# Patient Record
Sex: FEMALE | Race: WHITE
[De-identification: names, ages, dates, MRNs, and addresses within clinical notes are randomized per-mention and may not be internally consistent; named-entity substitution may affect disease eponyms.]

---

## 2017-02-27 ENCOUNTER — HOSPITAL ENCOUNTER (EMERGENCY)
Dept: HOSPITAL 80 - CED | Age: 62
Discharge: HOME | End: 2017-02-27
Payer: COMMERCIAL

## 2017-02-27 VITALS
DIASTOLIC BLOOD PRESSURE: 62 MMHG | HEART RATE: 84 BPM | SYSTOLIC BLOOD PRESSURE: 145 MMHG | OXYGEN SATURATION: 97 % | TEMPERATURE: 98 F | RESPIRATION RATE: 18 BRPM

## 2017-02-27 DIAGNOSIS — J40: Primary | ICD-10-CM

## 2017-02-27 DIAGNOSIS — J04.0: ICD-10-CM

## 2017-02-27 NOTE — UCPHY
H & P


Time Seen by Provider: 02/27/17 11:27


Smoking Status: Never smoked


Constitutional: 





 Initial Vital Signs











Temperature (C)  36.6 C   02/27/17 10:55


 


Heart Rate  84   02/27/17 10:55


 


Respiratory Rate  18   02/27/17 10:55


 


Blood Pressure  145/62 H  02/27/17 10:55


 


O2 Sat (%)  97   02/27/17 10:55








 











O2 Delivery Mode               Room Air














Allergies/Adverse Reactions: 


 





aluminum acetate [From [place]] Allergy (Verified 11/10/16 08:28)


 


benzalkonium [From [place]] Allergy (Verified 11/10/16 08:28)


 


diltiazem Allergy (Verified 11/10/16 08:28)


 


iodine Allergy (Verified 11/10/16 08:28)


 


protein supplement [From [place]] Allergy (Verified 11/10/16 08:28)


 


sumatriptan [From Imitrex] Allergy (Verified 11/10/16 08:28)


 


sumatriptan succinate [From Imitrex] Allergy (Verified 11/10/16 08:28)


 


zileuton [From Zyflo] Allergy (Verified 11/10/16 08:28)


 


STEROIDS ENDING IN NELIA Allergy (Uncoded 09/16/10 18:06)


 








Home Medications: 














 Medication  Instructions  Recorded


 


Allegra Allergy  11/10/16


 


Amlodipine Besylate  11/10/16


 


Amoxicillin/Clavulanate Pot 875 mg PO BID #14 tab 11/10/16





[Augmentin 875 MG TAB (*)]  


 


Bystolic  11/10/16


 


Estrace  11/10/16


 


Fluticasone Nasal [Flonase Nasal 2 sprays NASAL DAILY #1 mdi 11/10/16





Spray (RX)]  


 


LORAZEPAM  11/10/16


 


Losartan Potassium  11/10/16


 


Montelukast Sodium  11/10/16


 


No Doze  11/10/16


 


Pantoprazole Sodium  11/10/16


 


AZITHROMYCIN [Z-PACK] 250 mg PO DAILY #6 tab 02/27/17


 


methylPREDNISolone [Medrol Dose 1 each PO AD #1 ea 02/27/17





Alan]  














Medical Decision Making





- Data Points


Medications Given: 





 








Discontinued Medications





Albuterol/Ipratropium (Duoneb)  3 ml IH EDNOW ONE


   Stop: 02/27/17 11:49


   Last Admin: 02/27/17 12:06 Dose:  3 ml








Departure





- Departure


Disposition: Home, Routine, Self-Care


Clinical Impression: 


 Bronchitis, Laryngitis





Condition: Good


Instructions:  Acute Bronchitis (ED), Laryngitis (ED)


Additional Instructions: 


Follow-up with your primary care physician in 3-5 days.


Referrals: 


[facility] [Other] - As per Instructions


Prescriptions: 


AZITHROMYCIN [Z-PACK] 250 mg PO DAILY #6 tab


methylPREDNISolone [Medrol Dose Alan] 1 each PO AD #1 ea





- PQRS


PQRS Measurement: 





na

## 2017-10-02 ENCOUNTER — HOSPITAL ENCOUNTER (OUTPATIENT)
Dept: HOSPITAL 80 - FED | Age: 62
Setting detail: OBSERVATION
LOS: 1 days | Discharge: HOME | End: 2017-10-03
Attending: INTERNAL MEDICINE | Admitting: INTERNAL MEDICINE
Payer: COMMERCIAL

## 2017-10-02 DIAGNOSIS — R07.9: Primary | ICD-10-CM

## 2017-10-02 DIAGNOSIS — K21.9: ICD-10-CM

## 2017-10-02 DIAGNOSIS — I10: ICD-10-CM

## 2017-10-02 DIAGNOSIS — R10.13: ICD-10-CM

## 2017-10-02 DIAGNOSIS — G90.50: ICD-10-CM

## 2017-10-02 LAB
% IMMATURE GRANULYOCYTES: 0.3 % (ref 0–1.1)
ABSOLUTE IMMATURE GRANULOCYTES: 0.02 10^3/UL (ref 0–0.1)
ABSOLUTE NRBC COUNT: 0 10^3/UL (ref 0–0.01)
ADD DIFF?: NO
ADD MORPH?: NO
ADD SCAN?: NO
ALBUMIN SERPL-MCNC: 4 G/DL (ref 3.5–5)
ALP SERPL-CCNC: 62 IU/L (ref 38–126)
ALT SERPL-CCNC: 28 IU/L (ref 9–52)
ANION GAP SERPL CALC-SCNC: 10 MEQ/L (ref 8–16)
AST SERPL-CCNC: 30 IU/L (ref 14–46)
ATYPICAL LYMPHOCYTE FLAG: 10 (ref 0–99)
BILIRUB SERPL-MCNC: 0.7 MG/DL (ref 0.1–1.4)
BILIRUBIN-CONJUGATED: 0.3 MG/DL (ref 0–0.5)
BILIRUBIN-UNCONJUGATED: 0.4 MG/DL (ref 0–1.1)
CALCIUM SERPL-MCNC: 10 MG/DL (ref 8.5–10.4)
CHLORIDE SERPL-SCNC: 103 MEQ/L (ref 97–110)
CO2 SERPL-SCNC: 25 MEQ/L (ref 22–31)
CREAT SERPL-MCNC: 0.9 MG/DL (ref 0.6–1)
ERYTHROCYTE [DISTWIDTH] IN BLOOD BY AUTOMATED COUNT: 12.9 % (ref 11.5–15.2)
FRAGMENT RBC FLAG: 0 (ref 0–99)
GFR SERPL CREATININE-BSD FRML MDRD: > 60 ML/MIN/{1.73_M2}
GLUCOSE SERPL-MCNC: 86 MG/DL (ref 70–100)
HCT VFR BLD CALC: 46.7 % (ref 38–47)
HGB BLD-MCNC: 15.9 G/DL (ref 12.6–16.3)
LEFT SHIFT FLG: 0 (ref 0–99)
LIPEMIA HEMOLYSIS FLAG: 90 (ref 0–99)
MCH RBC BLDCO QN: 30.6 PG (ref 27.9–34.1)
MCHC RBC AUTO-ENTMCNC: 34 G/DL (ref 32.4–36.7)
MCV RBC AUTO: 89.8 FL (ref 81.5–99.8)
NRBC-AUTO%: 0 % (ref 0–0.2)
PLATELET # BLD: 242 10^3/UL (ref 150–400)
PLATELET CLUMPS FLAG: 0 (ref 0–99)
PMV BLD AUTO: 10.4 FL (ref 8.7–11.7)
POTASSIUM SERPL-SCNC: 3.8 MEQ/L (ref 3.5–5.2)
PROT SERPL-MCNC: 6.9 G/DL (ref 6.3–8.2)
RBC # BLD AUTO: 5.2 10^6/UL (ref 4.18–5.33)
SODIUM SERPL-SCNC: 138 MEQ/L (ref 134–144)
SPECIMEN HEMOLYSIS: 100
TROPONIN I SERPL-MCNC: < 0.012 NG/ML (ref 0–0.03)

## 2017-10-02 PROCEDURE — 71020: CPT

## 2017-10-02 PROCEDURE — 93005 ELECTROCARDIOGRAM TRACING: CPT

## 2017-10-02 PROCEDURE — 93017 CV STRESS TEST TRACING ONLY: CPT

## 2017-10-02 PROCEDURE — G0378 HOSPITAL OBSERVATION PER HR: HCPCS

## 2017-10-02 RX ADMIN — GUAIFENESIN SCH MG: 600 TABLET, EXTENDED RELEASE ORAL at 20:42

## 2017-10-02 RX ADMIN — PANTOPRAZOLE SODIUM SCH MG: 40 TABLET, DELAYED RELEASE ORAL at 18:21

## 2017-10-02 NOTE — GHP
[f rep st]



                                                            HISTORY AND PHYSICAL





DATE OF ADMISSION:  10/02/2017



CHIEF COMPLAINT:  Chest pain.



HISTORY OF PRESENT ILLNESS:  A 62-year-old female with a history of recent chronic epigastric pain, b
eing managed by outpatient Gastroenterology and her primary care provider, who reports more consisten
t and intense epigastric pain over the course of the last week.  The patient then noted approximately
 72 hours ago that she was developing intermittent left-sided pressure-like chest pain that occasiona
lly would involve her left shoulder.  The patient noted the symptoms specifically when she was exerti
ng herself and said predictably the symptoms would resolve when she rested.  It was not associated wi
th shortness of breath, nausea, vomiting, or diaphoresis.  She does report that these chest symptoms 
are new and that prior to this she was having only epigastric pain, without any exertional component.




PAST MEDICAL HISTORY:  

1.  Adrenal adenoma.

2.  Hypertension.

3.  Thyroid nodules.

4.  Mast cell activation syndrome.

5.  Gastroesophageal reflux disease, on both PPI and H2 blocker.

6.  Regional pain syndrome, chronic.



SOCIAL HISTORY:  Negative for tobacco, alcohol or illicit drugs.



FAMILY HISTORY:  Negative for heart disease.



REVIEW OF SYSTEMS:  A 10-point review of systems is negative, with the exception of that reported in 
the HPI.



PHYSICAL EXAMINATION:  VITAL SIGNS:  Blood pressure 149/80, heart rate 66, respiratory rate 17, 94% o
n room air.  GENERAL:  This is a healthy-appearing middle-aged female in no acute distress.  HEENT:  
Notable for moist mucous membranes.  Eye exam is negative for any icterus.  CARDIAC:  Regular rate an
d rhythm.  PULMONARY:  Clear to auscultation bilaterally.  GASTROINTESTINAL:  Positive bowel sounds. 
 The abdomen is soft and nontender in all 4 quadrants.  MUSCULOSKELETAL:  Negative for any lower extr
emity edema.  SKIN:  Negative for any rashes.  NEUROLOGIC:  She is alert and oriented x3.  PSYCHIATRI
C:  She is pleasant and cooperative on interview and examination.



DATA:  Troponin is less than 0.012.  CBC and BMP are normal. 



EKG, which I personally reviewed and interpreted, shows no acute infiltrates or edema.  EKG shows no 
acute changes.



ASSESSMENT AND PLAN:  This is a 62-year-old female presenting with exertional chest pain.

1.  Acute exertional chest pain.  Suspicion for cardiac ischemia is low based on her recent intermitt
ent inconsistent symptoms, without a troponin excursion.  EKG is not concerning for an acute injury. 
 Will admit.  Rule out with serial troponins, EKGs.  Have ordered a treadmill test for the morning if
 she effectively rules out.

2.  Hypertension.  Will continue the patient's home medications once reconciled.

3.  Epigastric pain.  The patient has already had CT abdominal imaging and a thorough workup by [facility], including an EGD earlier this spring.  Will simply continue her home medicati
ons and follow clinically.

4.  Prophylaxis with Lovenox.

5.  Diet:  Cardiac.



DISPOSITION:  I expect less than 2 midnights if the patient rules out and has negative stress testing
 in the morning. 



I have discussed the case with the emergency room physician.  The patient will be triaged to the PCU 
for cardiac monitoring, rule out and care.





Job #:  [id number]

## 2017-10-02 NOTE — CPEKG
Heart Rate: 69

RR Interval: 870

P-R Interval: 176

QRSD Interval: 104

QT Interval: 396

QTC Interval: 425

P Axis: 69

QRS Axis: 10

T Wave Axis: 13

EKG Severity - NORMAL ECG -

EKG Impression: SINUS RHYTHM

Electronically Signed By: Raulito Gotti 02-Oct-2017 15:13:34

## 2017-10-02 NOTE — EDPHY
H & P


Time Seen by Provider: 10/02/17 10:28


HPI/ROS: 





Chief complaint.  Chest pain





HPI.  62-year-old female presents emergency department with chest pain for 3-4 

days.  Described as sharp radiates to neck, left shoulder blade and arm.  Began 

1 week ago with low abdominal cramping.  4 days ago was in the upper abdomen.  

She had a CT abdomen pelvis with oral contrast she has problems with IV 

contrast.  This was reported as normal.  Symptoms are better with rest and 

worse with exertion.  No real shortness of breath that has a known PFO and gets 

hypoxic with exercise.  She has had no fever or cough.  It feels like GERD but 

much more intense and she has been using to medications for her reflux without 

affect.  She had a cardiac catheterization in 2015 which showed PFO.  She does 

have a history of arrhythmias.  She does have ASCVD but does not know the 

results of her catheterization.





ROS


Constitutional.  no fever/chills, no weakness


Eyes.  no problems with vision


ENT.  no sore throat, no nasal drainage


Cardiovascular.  Chest pain


Respiratory.  no shortness of breath, no cough


Abdominal.  Upper abdominal pain


.  no problems urinating


MS.  no calf pain/swelling, no neck/back pain, no joint pain


Skin.  no rash


Lymph.  no swollen glands


Neuro.  no headache, no dizziness, no difficulty walking or with speech


Past Medical/Surgical History: 





Past medical history mast cell activation syndrome, hypertension, 

cholecystectomy, PFO, angina, complex migraines, GERD, PVCs, known right bundle 

branch block, regional pain syndrome right arm, hysterectomy


Social History: 





, nonsmoker, no alcohol


Smoking Status: Never smoked


Physical Exam: 





General Appearance:  Alert well-developed female mild distress vital signs 

stable


Eyes: Pupils equal and round no pallor or injection.


ENT, Mouth:  Mucous membranes are moist.


Respiratory:  There are no retractions, lungs are clear to auscultation.


Cardiovascular: Regular rate and rhythm.


Gastrointestinal:   Abdomen is soft and mild tenderness in the epigastrium, no 

masses, bowel sounds normal.


Neurological:  Awake and alert, sensory and motor exams grossly normal.


Skin: Warm and dry, no rashes.


Musculoskeletal:  Neck is supple nontender.


Extremities  symmetrical, full range of motion.


Psychiatric: Patient is oriented X 3, there is no agitation.


Constitutional: 


 Initial Vital Signs











Temperature (C)  37.0 C   10/02/17 10:18


 


Heart Rate  88   10/02/17 10:18


 


Respiratory Rate  16   10/02/17 10:18


 


Blood Pressure  144/104 H  10/02/17 10:18








 











O2 Delivery Mode               Room Air














Allergies/Adverse Reactions: 


 





aluminum acetate [From [place]] Allergy (Verified 11/10/16 08:28)


 


benzalkonium [From [place]] Allergy (Verified 11/10/16 08:28)


 


diltiazem Allergy (Verified 11/10/16 08:28)


 


iodine Allergy (Verified 11/10/16 08:28)


 


protein supplement [From [place]] Allergy (Verified 11/10/16 08:28)


 


sumatriptan [From Imitrex] Allergy (Verified 11/10/16 08:28)


 


sumatriptan succinate [From Imitrex] Allergy (Verified 11/10/16 08:28)


 


zileuton [From Zyflo] Allergy (Verified 11/10/16 08:28)


 


STEROIDS ENDING IN NELIA Allergy (Uncoded 09/16/10 18:06)


 








Home Medications: 














 Medication  Instructions  Recorded


 


Estradiol [Estrace Vaginal (*)] 1 karley VAG HS 11/10/16


 


Fluticasone Nasal [Flonase Nasal 2 sprays NASAL DAILY #1 mdi 11/10/16





Spray (RX)]  


 


Ipratropium/Albuterol [Duoneb (*)] 3 ml IH DAILY PRN 11/10/16


 


LORazepam [Ativan (*)] 1 mg PO  11/10/16


 


Losartan Potassium [Cozaar 50 mg 100 mg PO  11/10/16





(*)]  


 


Montelukast Sodium [Singulair 10 10 mg PO DAILY@1800 11/10/16





mg (*)]  


 


Nebivolol HCl [Bystolic 5 mg (*)] 5 mg PO HS 11/10/16


 


Pantoprazole Sodium [Protonix 40mg 40 mg PO DAILY 11/10/16





(*)]  


 


amLODIPine BESYLATE [Norvasc 5 mg 5 mg PO DAILY 11/10/16





(*)]  


 


diphenhydrAMINE [Benadryl 25 MG 25 mg PO DAILY PRN 11/10/16





(*)]  


 


Albuterol [Proventil Inhaler HFA 1 - 2 puffs IH DAILY PRN 10/02/17





(*)]  


 


Albuterol [Proventil Neb] 3 ml IH DAILY PRN 10/02/17


 


Aspirin [Aspirin 325 mg (*)] 325 mg PO DAILY 10/02/17


 


Cholecalciferol Vit D3 [Vitamin D3 5,000 units PO DAILY 10/02/17





(*)]  


 


EPINEPHrine [Epipen 0.3 MG] 0.3 mg IM ONCE PRN 10/02/17


 


Estradiol [Vivelle-Dot 0.075MG (*)] 0.075 mg TD RODRÍGUEZ 10/02/17


 


FLUTICASONE/SALMETEROL [ADVAIR HFA 1 puffs IH DAILY PRN 10/02/17





230-21 MCG INHALER]  


 


Famotidine [Pepcid 20 MG (*)] 20 mg PO BID 10/02/17


 


Fluticasone/Salmeterol [Advair Hfa 1 puffs IH DAILY PRN 10/02/17





115-21 Mcg Inhaler]  


 


Herbals/Supplements -Info Only 1 ea PO DAILY 10/02/17


 


Vitamin B Complex [B Complex] 1 each PO DAILY 10/02/17


 


guaiFENesin [Mucinex 600 MG (*)] 600 mg PO BID 10/02/17


 


predniSONE [predniSONE] 10 mg PO DAILY PRN 10/02/17














Medical Decision Making





- Diagnostics


EKG Interpretation: 





EKG interpreted by me shows normal sinus rhythm with normal interval and axis.  

QRS is normal there is no significant ST elevation or depression.  There is no 

arrhythmia.  The rate is 69


Imaging Results: 


 Imaging Impressions





Chest X-Ray  10/02/17 10:33


Impression: No evidence of acute cardiopulmonary abnormality.








Chest x-ray interpreted by me is negative


Procedures: 





IV normal saline, monitor


ED Course/Re-evaluation: 





Re-evaluation 12:30 p.m. patient is stable.  The patient and I discussed 

imaging and lab results.  We discussed treatment plan including recommendation 

for admission.  She expresses understanding





I consulted discussed the case with Dr. Alfonso, hospitalist, who agrees to the 

admission


Differential Diagnosis: 





I considered acute coronary syndrome, pulmonary embolus, GERD, pancreatitis.  

It is concerning that the patient has exertional symptoms that are relieved by 

rest increased concern for acute coronary syndrome





- Data Points


Laboratory Results: 


 Laboratory Results





 10/02/17 10:35 





 10/02/17 10:35 





 











  10/02/17 10/02/17





  10:35 10:35


 


WBC    6.45 10^3/uL 10^3/uL





    (3.80-9.50) 


 


RBC    5.20 10^6/uL 10^6/uL





    (4.18-5.33) 


 


Hgb    15.9 g/dL g/dL





    (12.6-16.3) 


 


Hct    46.7 % %





    (38.0-47.0) 


 


MCV    89.8 fL fL





    (81.5-99.8) 


 


MCH    30.6 pg pg





    (27.9-34.1) 


 


MCHC    34.0 g/dL g/dL





    (32.4-36.7) 


 


RDW    12.9 % %





    (11.5-15.2) 


 


Plt Count    242 10^3/uL 10^3/uL





    (150-400) 


 


MPV    10.4 fL fL





    (8.7-11.7) 


 


Neut % (Auto)    63.3 % %





    (39.3-74.2) 


 


Lymph % (Auto)    28.5 % %





    (15.0-45.0) 


 


Mono % (Auto)    5.0 % %





    (4.5-13.0) 


 


Eos % (Auto)    1.7 % %





    (0.6-7.6) 


 


Baso % (Auto)    1.2 % %





    (0.3-1.7) 


 


Nucleat RBC Rel Count    0.0 % %





    (0.0-0.2) 


 


Absolute Neuts (auto)    4.08 10^3/uL 10^3/uL





    (1.70-6.50) 


 


Absolute Lymphs (auto)    1.84 10^3/uL 10^3/uL





    (1.00-3.00) 


 


Absolute Monos (auto)    0.32 10^3/uL 10^3/uL





    (0.30-0.80) 


 


Absolute Eos (auto)    0.11 10^3/uL 10^3/uL





    (0.03-0.40) 


 


Absolute Basos (auto)    0.08 10^3/uL 10^3/uL





    (0.02-0.10) 


 


Absolute Nucleated RBC    0.00 10^3/uL 10^3/uL





    (0-0.01) 


 


Immature Gran %    0.3 % %





    (0.0-1.1) 


 


Immature Gran #    0.02 10^3/uL 10^3/uL





    (0.00-0.10) 


 


Sodium  138 mEq/L mEq/L  





   (134-144)  


 


Potassium  3.8 mEq/L mEq/L  





   (3.5-5.2)  


 


Chloride  103 mEq/L mEq/L  





   ()  


 


Carbon Dioxide  25 mEq/l mEq/l  





   (22-31)  


 


Anion Gap  10 mEq/L mEq/L  





   (8-16)  


 


BUN  20 mg/dL mg/dL  





   (7-23)  


 


Creatinine  0.9 mg/dL mg/dL  





   (0.6-1.0)  


 


Estimated GFR  > 60   





   


 


Glucose  86 mg/dL mg/dL  





   ()  


 


Calcium  10.0 mg/dL mg/dL  





   (8.5-10.4)  


 


Total Bilirubin  0.7 mg/dL mg/dL  





   (0.1-1.4)  


 


Conjugated Bilirubin  0.3 mg/dL mg/dL  





   (0.0-0.5)  


 


Unconjugated Bilirubin  0.4 mg/dL mg/dL  





   (0.0-1.1)  


 


AST  30 IU/L IU/L  





   (14-46)  


 


ALT  28 IU/L IU/L  





   (9-52)  


 


Alkaline Phosphatase  62 IU/L IU/L  





   ()  


 


Troponin I  < 0.012 ng/mL ng/mL  





   (0.000-0.034)  


 


Total Protein  6.9 g/dL g/dL  





   (6.3-8.2)  


 


Albumin  4.0 g/dL g/dL  





   (3.5-5.0)  


 


Lipase  88 IU/L IU/L  





   ()  


 


Specimen Hemolysis  100   





   











Medications Given: 


 








Discontinued Medications





Pantoprazole Sodium (Protonix)  40 mg PO DAILY TIA


   Stop: 03/31/18 13:14


   Last Admin: 10/02/17 14:14 Dose:  Not Given








Departure





- Departure


Disposition: [facility]


Condition: Good

## 2017-10-03 VITALS
OXYGEN SATURATION: 94 % | SYSTOLIC BLOOD PRESSURE: 129 MMHG | RESPIRATION RATE: 18 BRPM | HEART RATE: 68 BPM | DIASTOLIC BLOOD PRESSURE: 85 MMHG

## 2017-10-03 VITALS — TEMPERATURE: 97.9 F

## 2017-10-03 RX ADMIN — PANTOPRAZOLE SODIUM SCH MG: 40 TABLET, DELAYED RELEASE ORAL at 10:28

## 2017-10-03 RX ADMIN — GUAIFENESIN SCH MG: 600 TABLET, EXTENDED RELEASE ORAL at 08:24

## 2017-10-03 NOTE — ASDISCHSUM
----------------------------------------------

Discharge Information

----------------------------------------------

Plan Status:Home with No Needs                       Medically Cleared to Leave:10/03/2017

Discharge Date:10/03/2017 12:12 PM                   CM D/C Disposition:Home, Routine, Self-Care

ADT D/C Disposition:Home, Routine, Self-Care         Projected Discharge Date:10/03/2017 12:12 PM

Transportation at D/C:Family                         Discharge Delay Reason:

Follow-Up Date:10/03/2017 12:12 PM                   Discharge Slot:

Final Diagnosis:

----------------------------------------------

Placement Information

----------------------------------------------

----------------------------------------------

Patient Contact Information

----------------------------------------------

Contact Name:ANAIS                            Relationship:

Address:[address]                                     Home [phone]

                                                     Work [phone]

City:[place]                                       Alternate Phone:

[facility]/Zip Code:[place] [address]                              Email:

----------------------------------------------

Financial Information

----------------------------------------------

Financial Class:[employer]

Primary Plan Desc:[facility]       Primary Plan Number:[id number]

Secondary Plan Desc:                                 Secondary Plan Number:

 

 

----------------------------------------------

Assessment Information

----------------------------------------------

----------------------------------------------

[facility] CM Progress Note

----------------------------------------------

CM Note

 

CM Note                       

Notes:

Met with pt.   



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



Pt reports  coming to transport home.



No case management d/c needs identified d/t pt age and activity levels prior to admission.



Case management d/c poc:  Home Independent when medically stable.

 

Date Signed:  10/03/2017 11:00 AM

Electronically Signed By:Kenya Danielson RN

 

 

----------------------------------------------

Intervention Information

----------------------------------------------

## 2017-10-03 NOTE — GDS
[f rep st]



                                                             DISCHARGE SUMMARY





DISCHARGE DIAGNOSES:  Include:  

1.  Chest pain, thought not cardiac.

2.  Epigastric pain, etiology unclear.

3.  Suspected mast cell activation syndrome.

4.  History of adrenal adenoma.

5.  Hypertension.

6.  History of thyroid nodules.

7.  Gastroesophageal reflux disease.

8.  Regional pain syndrome.



HISTORY OF PRESENT ILLNESS:  This is a 62-year-old female, who presented with complaints of persisten
t chronic epigastric pain and exertional left-sided chest pressure.  [facility] please see the History and Physical dated 10/02/2017.



CONSULTATIVE SERVICES:  None.



PROCEDURES:  On 10/03/2017, the patient underwent graded treadmill stress testing.  The patient had n
o EKG indications of ischemia, with appropriate heart rate and blood pressure responses to stress.  S
he did have persistent epigastric symptoms and some flushing during her recovery phase, thought nonca
rdiac in nature when testing reviewed by Cardiology.



HOSPITAL COURSE:  By issue:  

1.  Chest pain.  The patient has unusual history that made her symptoms more likely noncardiac, howev
er, does have hypertension and some family history of relatives with heart disease later in life.  Th
e patient was ruled out overnight on the PCU and taken for treadmill stress testing, which was negati
ve for any inducible ischemia.  We have recommended that the patient continue following with her outp
atient gastroenterology and primary care team for long-term management of her suspected mast cell act
ivation syndrome and chronic epigastric pain.

2.  Hypertension.  The patient's blood pressure remained well controlled on her home medications.  No
 changes were made to her regimen during this hospital stay.



MEDICATIONS AT TIME OF DISPOSITION:  Please reference the med rec printed on 10/03/2017.



PENDING STUDIES:  At the time of this dictation, none.



FOLLOWUP:  Appointments include:  

1.  With outpatient Gastroenterology for continued workup of her persistent epigastric pain.

2.  With her PCP for ongoing med titration for her suspected mast cell activation syndrome. 



I spent greater than 30 minutes in the planning and coordination of this discharge.





Job #:  [id number]

## 2017-10-03 NOTE — CPR
[f rep st]



                                                  NONINVASIVE CARDIAC PROCEDURE REPORT





TEST PERFORMED:  Treadmill stress test.



TEST ORDERED BY:  Dr. Haley Alfonso.



REASON FOR TEST:  Chest discomfort, epigastric discomfort that radiates to back, and dizziness.



FINDINGS:  Resting EKG shows a regular sinus rhythm with a ventricular rate of 67.  There are no isch
emic changes noted.  She does have asthma and she did use her inhaler prior to starting exercise.  Re
sting blood pressure 120/84.  Resting heart rate 67. 



Exercise portion:  She exercised according to the Laz protocol for a total of 9 minutes and 26 seco
nds.  She reached her maximal heart rate of 140.  Max MET level 10.4.  She had mild epigastric discom
fort at 2 minutes into the exercise portion.  At times, it did radiate through to her back.  Her EKG 
remained regular sinus rhythm throughout these episodes.  She was able to exercise into the 3rd stage
.  At that time, she did feel chest discomfort and slight lightheadedness.  The exercise was stopped.
  Her EKG remained stable with no ischemic changes.  



She then was laid down for recovery.  She became nauseated.  The epigastric discomfort did subside wh
ile lying down.  She would feel episodes of dizziness and facial flush with her face color bright red
.  Her blood pressure at that time was 106/80.  At conclusion of the test recovery period, her blood 
pressure did normalize.  In recovery her EKG remained regular sinus rhythm.  Final resting blood pres
sure 116/76, heart rate 91, and she was feeling much better.  The nausea, dizziness, and lightheadedn
ess had subsided along with the epigastric discomfort.  



This case was discussed with Dr. Chavez Nicolas.  She has a history of mast cell and also a PFO with no 
closure due to likely mast cell reactions.  She did have a cath in 2015 that showed no obstructive di
sease.  She has continued to have this epigastric discomfort since 2015.  Her symptoms have not robles
ed from that time.  She also has a history of some adrenal abnormalities.  It is felt that the epigas
tric discomfort that progresses to chest discomfort and nausea and resulting dizziness and the facial
 flush is likely related to the mass cell and released of histamines.  This was discussed with Dr. Acosta who will further evaluate and make recommendations.  



At the conclusion of the test she was feeling much better.  Her EKG remained stable with regular sinu
s rhythm.  At this time, she is stable to return to her room.





Job #:  [id number]

## 2018-11-06 ENCOUNTER — HOSPITAL ENCOUNTER (EMERGENCY)
Dept: HOSPITAL 80 - FED | Age: 63
Discharge: HOME | End: 2018-11-06
Payer: COMMERCIAL

## 2018-11-06 VITALS — DIASTOLIC BLOOD PRESSURE: 73 MMHG | SYSTOLIC BLOOD PRESSURE: 122 MMHG

## 2018-11-06 DIAGNOSIS — R00.0: ICD-10-CM

## 2018-11-06 DIAGNOSIS — I20.8: ICD-10-CM

## 2018-11-06 DIAGNOSIS — I10: ICD-10-CM

## 2018-11-06 DIAGNOSIS — R07.9: Primary | ICD-10-CM

## 2018-11-06 NOTE — CPEKG
Test Reason : OPEN

Blood Pressure : ***/*** mmHG

Vent. Rate : 077 BPM     Atrial Rate : 077 BPM

   P-R Int : 171 ms          QRS Dur : 106 ms

    QT Int : 395 ms       P-R-T Axes : 067 004 032 degrees

   QTc Int : 448 ms

 

Sinus rhythm

 

Confirmed by Eliceo Pearl ([id number]) on 11/6/2018 11:54:08 AM

 

Referred By:             Confirmed By:Eliceo Pearl

## 2018-11-06 NOTE — EDPHY
H & P


Stated Complaint: CP, HIGH HR


Time Seen by Provider: 11/06/18 11:36


HPI/ROS: 





CHIEF COMPLAINT:  Palpitations, episodic chest pain





HISTORY OF PRESENT ILLNESS:  The patient has a history of intermittent 

arrhythmia as well as "microvascular angina."  She presents to the ED with 

complaints of tachycardia and chest pain that is been occurring intermittently 

at rest for the past day.  The patient did receive a shingles vaccination 

yesterday.  The patient has no history of coronary artery disease.  She 

currently is taking Coreg.  The patient denies cough or congestion.  She denies 

asymmetric calf pain or swelling.  She currently is chest pain-free.





REVIEW OF SYSTEMS:


A comprehensive 10 point review of systems is otherwise negative aside from 

elements mentioned in the history of present illness.


Source: Patient





- Personal History


Current Tetanus Diphtheria and Acellular Pertussis (TDAP): Yes





- Medical/Surgical History


Hx Asthma: Yes


Hx Chronic Respiratory Disease: No


Hx Diabetes: No


Hx Cardiac Disease: No


Hx Renal Disease: No


Hx Cirrhosis: No


Hx Alcoholism: No


Hx HIV/AIDS: No


Hx Splenectomy or Spleen Trauma: No


Other PMH: mast cell activation syndrome.  HTN, junior, PFO, angina,.  complex 

migraines.  GERd, PVCs.  B rotator cuff, RBBB, right adrenalectomy, chronic 

regionl pain syndrome (right arm).  regional pain syndrome r arm.  B achilles.  

hyst





- Social History


Smoking Status: Never smoked





- Physical Exam


Exam: 





General Appearance:  Alert, no distress


Eyes:  Pupils equal and round no pallor or injection


ENT, Mouth:  Mucous membranes moist


Respiratory:  There are no retractions, lungs are clear to auscultation


Cardiovascular:  Regular rate and rhythm


Gastrointestinal:  Abdomen is soft and nontender, no masses, bowel sounds normal


Neurological:  A&O, normal motor function, normal sensory exam, normal cranial 

nerves


Skin:  Warm and dry, no rashes


Musculoskeletal:  Neck is supple nontender


Extremities:  symmetrical, full range of motion


Psychiatric:  Patient is oriented X 3, there is no agitation








Constitutional: 


 Initial Vital Signs











Temperature (C)  36.8 C   11/06/18 11:37


 


Heart Rate  87   11/06/18 11:37


 


Respiratory Rate  18   11/06/18 11:37


 


Blood Pressure  153/114 H  11/06/18 11:37


 


O2 Sat (%)  95   11/06/18 11:37








 











O2 Delivery Mode               Room Air














Allergies/Adverse Reactions: 


 





aluminum acetate [From [place]] Allergy (Verified 11/10/16 08:28)


 


benzalkonium [From [place]] Allergy (Verified 11/10/16 08:28)


 


diltiazem Allergy (Verified 11/10/16 08:28)


 


iodine Allergy (Verified 11/10/16 08:28)


 


protein supplement [From [place]] Allergy (Verified 11/10/16 08:28)


 


sumatriptan [From Imitrex] Allergy (Verified 11/10/16 08:28)


 


sumatriptan succinate [From Imitrex] Allergy (Verified 11/10/16 08:28)


 


zileuton [From Zyflo] Allergy (Verified 11/10/16 08:28)


 


STEROIDS ENDING IN NELIA Allergy (Uncoded 09/16/10 18:06)


 








Home Medications: 














 Medication  Instructions  Recorded


 


Estradiol [Estrace Vaginal (*)] 1 karley VAG HS 11/10/16


 


Fluticasone Nasal [Flonase Nasal 2 sprays NASAL DAILY #1 mdi 11/10/16





Spray]  


 


Ipratropium/Albuterol [Duoneb (*)] 3 ml IH DAILY PRN 11/10/16


 


LORazepam [Ativan (*)] 1 mg PO HS 11/10/16


 


Losartan Potassium [Cozaar 50 mg 100 mg PO HS 11/10/16





(*)]  


 


Montelukast Sodium [Singulair 10 10 mg PO DAILY@1800 11/10/16





mg (*)]  


 


Nebivolol HCl [Bystolic 5 mg (*)] 5 mg PO HS 11/10/16


 


Pantoprazole Sodium [Protonix 40mg 40 mg PO BID 11/10/16





(*)]  


 


amLODIPine BESYLATE [Norvasc 5 mg 5 mg PO DAILY 11/10/16





(*)]  


 


diphenhydrAMINE [Benadryl 25 MG 25 mg PO DAILY PRN 11/10/16





(*)]  


 


Albuterol [Proventil Inhaler HFA 1 - 2 puffs IH DAILY PRN 10/02/17





(*)]  


 


Albuterol [Proventil Neb] 3 ml IH DAILY PRN 10/02/17


 


Aspirin [Aspirin 325 mg (*)] 325 mg PO DAILY 10/02/17


 


Cholecalciferol Vit D3 [Vitamin D3 5,000 units PO DAILY 10/02/17





(*)]  


 


EPINEPHrine [Epipen 0.3 MG] 0.3 mg IM ONCE PRN 10/02/17


 


Estradiol [Vivelle-Dot 0.075MG (*)] 0.075 mg TD [id number] 10/02/17


 


FLUTICASONE/SALMETEROL [ADVAIR HFA 1 puffs IH DAILY PRN 10/02/17





230-21 MCG INHALER]  


 


Famotidine [Pepcid 20 MG (*)] 20 mg PO BID 10/02/17


 


Fluticasone/Salmeterol [Advair Hfa 1 puffs IH DAILY PRN 10/02/17





115-21 Mcg Inhaler]  


 


Herbals/Supplements -Info Only 1 ea PO DAILY 10/02/17


 


Vitamin B Complex [B Complex] 1 each PO DAILY 10/02/17


 


guaiFENesin [Mucinex 600 MG (*)] 600 mg PO BID 10/02/17


 


predniSONE 10 mg PO DAILY PRN 10/02/17














Medical Decision Making





- Diagnostics


EKG Interpretation: 





EKG:  Complete interpretation has been separately recorded in the [facility] 

archive.  Summary impression:  Sinus rhythm, rate 77


ED Course/Re-evaluation: 





The patient presents to the ED with complaints of palpitations and atypical 

chest pain after receiving this shingles vaccination.  The patient does have a 

history of microvascular angina.  She has no known history of coronary artery 

disease.  She typically is followed with exercise stress test which have been 

normal.





Workup in the emergency department is unrevealing.  She has no evidence of an 

arrhythmia noted on her EKG.  There are no ischemic changes.  Despite a day of 

symptoms her troponin is normal.





At this point time I do feel the patient can be discharged home with 

instructions to return to the ED for markedly worsening symptoms or other 

concerns.








Differential Diagnosis: 





Differential diagnosis considered includes vaccination reaction, acute coronary 

syndrome, pericarditis, arrhythmia





- Data Points


Laboratory Results: 


 











  11/06/18





  11:57


 


POC Troponin I  0.00 ng/mL ng/mL





   (0.00-0.08) 











Point of Care Test Results: 


 Chemistry











  11/06/18





  11:57


 


POC Troponin I  0.00 ng/mL ng/mL





   (0.00-0.08) 














Departure





- Departure


Disposition: Home, Routine, Self-Care


Clinical Impression: 


 Chest pain





Condition: Good


Instructions:  Chest Pain (ED)


Additional Instructions: 


1. Based upon the testing done in the Emergency Department today we see no 

evidence of a heart attack.


2. We are unable to fully exclude coronary artery disease based upon the 

testing available in the Emergency Department.


3. For this reason, we would like you to be seen by cardiology for 

consideration of additional testing within the next 3 days.


4. Please contact the cardiologist you have been referred to schedule this 

appointment as soon as possible. Their offices are typically open from 8:30am-

5pm M-F. 


5. Please return to the Emergency Department immediately for any recurrent 

chest pain, difficulty breathing or other concerns.











Referrals: 


Elizabeth Hall MD [Primary Care Provider] - As per Instructions

## 2019-06-10 ENCOUNTER — HOSPITAL ENCOUNTER (EMERGENCY)
Dept: HOSPITAL 80 - FED | Age: 64
Discharge: HOME | End: 2019-06-10
Payer: COMMERCIAL

## 2019-06-10 ENCOUNTER — HOSPITAL ENCOUNTER (OUTPATIENT)
Dept: HOSPITAL 80 - BMCIMAGING | Age: 64
End: 2019-06-10
Payer: COMMERCIAL